# Patient Record
Sex: MALE | Race: WHITE
[De-identification: names, ages, dates, MRNs, and addresses within clinical notes are randomized per-mention and may not be internally consistent; named-entity substitution may affect disease eponyms.]

---

## 2020-09-16 ENCOUNTER — HOSPITAL ENCOUNTER (OUTPATIENT)
Dept: HOSPITAL 47 - RADXRYALE | Age: 75
Discharge: HOME | End: 2020-09-16
Attending: PHYSICIAN ASSISTANT
Payer: MEDICARE

## 2020-09-16 DIAGNOSIS — M99.71: Primary | ICD-10-CM

## 2020-09-16 DIAGNOSIS — M43.12: ICD-10-CM

## 2020-09-16 PROCEDURE — 72050 X-RAY EXAM NECK SPINE 4/5VWS: CPT

## 2020-09-16 NOTE — XR
EXAMINATION TYPE: XR cervical spine comp

 

DATE OF EXAM: 9/16/2020

 

TECHNIQUE: Frontal, lateral, oblique, and open mouth view of the cervical spine are obtained.

 

HISTORY:  M542 CERVICALGIA chronic neck pain for 8 months.

 

COMPARISON: None

 

FINDINGS:  Osseous structures are demineralized which is dorsal radiographic sensitivity. The cervica
l spine is visualized in its entirety from C1 thru the top of T1 level, there is grade 1 anterolisthe
sis C3 on C4 and to a greater degree C4-C5. There is grade 1 retrolisthesis C5 on C6. The pre-vertebr
al soft tissue appears within normal limits.  The C1-C2 articulation is within normal limits on the o
pen mouth view. Vertebral body heights are maintained. Moderate to severe disc space narrowing C5-C6 
and C6-C7 levels. The oblique images are within normal limits. Overlying soft tissue is unremarkable.


 

IMPRESSION: As above.

## 2021-10-05 ENCOUNTER — HOSPITAL ENCOUNTER (OUTPATIENT)
Dept: HOSPITAL 47 - RADECHMAIN | Age: 76
Discharge: HOME | End: 2021-10-05
Attending: FAMILY MEDICINE
Payer: MEDICARE

## 2021-10-05 DIAGNOSIS — I35.0: Primary | ICD-10-CM

## 2021-10-05 PROCEDURE — 93306 TTE W/DOPPLER COMPLETE: CPT

## 2021-10-07 NOTE — ECHOF
Referral Reason:R01.1 heart murmur, I10 hypertension



MEASUREMENTS

--------

HEIGHT: 167.6 cm

WEIGHT: 52.6 kg

BP: 

RVIDd:   3.8 cm     (< 3.3)

IVSd:   1.3 cm     (0.6 - 1.1)

LVIDd:   3.5 cm     (3.9 - 5.3)

LVPWd:   1.2 cm     (0.6 - 1.1)

IVSs:   1.6 cm

LVIDs:   1.8 cm

LVPWs:   1.5 cm

LAESV Index (A-L):   25.56 ml/m

Ao Diam:   3.5 cm     (2.0 - 3.7)

AV Cusp:   1.2 cm     (1.5 - 2.6)

LA Diam:   3.1 cm     (2.7 - 3.8)

MV EXCURSION:   9.024 mm     (> 18.000)

MV EF SLOPE:   79 mm/s     (70 - 150)

EPSS:   0.8 cm

MV E Kosta:   0.90 m/s

MV DecT:   263 ms

MV A Kosta:   1.37 m/s

MV E/A Ratio:   0.66 

AV maxP.14 mmHg

AV meanP.94 mmHg

RAP:   5.00 mmHg

RVSP:   38.75 mmHg







FINDINGS

--------

Sinus rhythm.

This was a technically adequate study.

The left ventricular size is normal.   There is mild concentric left ventricular hypertrophy.   Overa
ll left ventricular systolic function is normal with, an EF between 55 - 60 %.   The diastolic fillin
g pattern is normal for the age of the patient 16.85.

The right ventricle is mild to moderately enlarged.

Normal LA  size by volume 22+/-6 ml/m2.

The right atrial size is normal.

Interatrial and interventricular septum intact.

There is no evidence of aortic regurgitation.   Moderate to severe aortic stenosis with peak/mean pre
ssure gradient of 61.14mmHg / 35.94mmHg, the aortic valve area by continuity equation is 0.9cm.   Pe
ak/mean gradient across the Aortic Valve is 61.14mmHg / 35.94mmHg.

Mild mitral regurgitation is present.

Mild tricuspid regurgitation present.   There is borderline pulmonary hypertension.   The right ventr
icular systolic pressure, as measured by Doppler, is 38.75mmHg.

There is no pulmonic regurgitation present.

The aortic root size is normal.

Normal inferior vena cava with normal inspiratory collapse consistent with estimated right atrial pre
ssure of  5 mmHg.

There is no pericardial effusion.



CONCLUSIONS

--------

1. The left ventricular size is normal.

2. There is mild concentric left ventricular hypertrophy.

3. Overall left ventricular systolic function is normal with, an EF between 55 - 60 %.

4. The right ventricle is mild to moderately enlarged.

5. Moderate to severe aortic stenosis with peak/mean pressure gradient of 61.14mmHg / 35.94mmHg, the 
aortic valve area by continuity equation is 0.9cm.

6. Peak/mean gradient across the Aortic Valve is 61.14mmHg / 35.94mmHg.

7. Mild mitral regurgitation is present.

8. Mild tricuspid regurgitation present.

9. There is borderline pulmonary hypertension.

10. The right ventricular systolic pressure, as measured by Doppler, is 38.75mmHg.





SONOGRAPHER: Bessy Hameed RDCS

## 2023-06-13 ENCOUNTER — HOSPITAL ENCOUNTER (OUTPATIENT)
Dept: HOSPITAL 47 - CATHCVL | Age: 78
Discharge: HOME | End: 2023-06-13
Attending: INTERNAL MEDICINE
Payer: MEDICARE

## 2023-06-13 VITALS
RESPIRATION RATE: 17 BRPM | TEMPERATURE: 98.1 F | DIASTOLIC BLOOD PRESSURE: 70 MMHG | SYSTOLIC BLOOD PRESSURE: 154 MMHG | HEART RATE: 95 BPM

## 2023-06-13 VITALS — BODY MASS INDEX: 25.8 KG/M2

## 2023-06-13 DIAGNOSIS — I35.0: ICD-10-CM

## 2023-06-13 DIAGNOSIS — I11.9: ICD-10-CM

## 2023-06-13 DIAGNOSIS — E78.5: ICD-10-CM

## 2023-06-13 DIAGNOSIS — E11.51: ICD-10-CM

## 2023-06-13 DIAGNOSIS — I70.213: ICD-10-CM

## 2023-06-13 DIAGNOSIS — I25.110: Primary | ICD-10-CM

## 2023-06-13 DIAGNOSIS — Z79.899: ICD-10-CM

## 2023-06-13 DIAGNOSIS — I70.0: ICD-10-CM

## 2023-06-13 PROCEDURE — 36200 PLACE CATHETER IN AORTA: CPT

## 2023-06-13 PROCEDURE — 76937 US GUIDE VASCULAR ACCESS: CPT

## 2023-06-13 PROCEDURE — 93458 L HRT ARTERY/VENTRICLE ANGIO: CPT

## 2023-06-13 PROCEDURE — 75625 CONTRAST EXAM ABDOMINL AORTA: CPT

## 2023-06-13 PROCEDURE — 99152 MOD SED SAME PHYS/QHP 5/>YRS: CPT

## 2023-06-13 PROCEDURE — 75716 ARTERY X-RAYS ARMS/LEGS: CPT

## 2023-06-13 NOTE — P.PCN
Date of Procedure: 06/13/23


Operative Findings: 





                            


CARDIAC CATHETERIZATION





PERFORMING PHYSICIAN: 


Mohan Snyder MD, RPVI





PROCEDURE PERFORMED:


1.  Selective right and left coronary angiogram


2.  Left heart catheterization


3.  Ultrasound-guided access of the right femoral artery





INDICATION:


Unstable angina and valvular heart disease





COMPLICATION:


None





APPROACH:


Right common femoral artery





LEVEL OF SEDATION:


Moderate with sedation in length of 10 minutes





PROCEDURE DESCRIPTION:


After obtaining an informed consent, the patient was brought to cardiac cath 

lab.  





Local anesthesia was performed using lidocaine subcutaneously.  





The right common femoral artery was cannulated using Seldinger technique, the 

guidewire passed easily, following that we advanced a 6 Solomon Islander sheath dilator 

assembly, the wire and dilator were removed and sheath was flushed.  





Selective right and left coronary angiogram using a 6-Solomon Islander JR4 and JL  

catheters.  





Following that we did left heart catheterization using 6-Solomon Islander pigtail 

catheter.  





The procedure was completed there was no complication.





SELECTIVE CORONARY ANGIOGRAM:


The right coronary artery:


Large caliber vessel and dominant vessel.  The mid RCA has a lesion in the 

midportion appears to be in the range of 30-40%





Left main:


Has mild disease only.  Bifurcates into a 6 and LAD





The left circumflex:


Large caliber vessel and on dominant vessel.  The LCx appeared to have mild 

disease only.  Gives rises into an I am which bifurcates into 2 subbranches and 

appeared to have mild disease only.





The left anterior descending artery:


The ostial LAD has mild disease only.  The mid and distal LAD appeared to be 

angiographically normal.  The LAD gives rise into a diagonal branch which seems 

to have mild disease only.





HEMODYNAMICS:


The LVEDP was 12 mmHg.  There was a mean gradient across the valve of 51 mmHg





CONCLUSION:


1.  Mild nonobstructive coronary artery disease


2.  Severe aortic stenosis with a mean gradient across the valve of 51 mmHg

## 2023-06-13 NOTE — P.PCN
Date of Procedure: 06/13/23


Operative Findings: 











AN ABDOMINAL AORTOGRAM AND BILATERAL LOWER EXTREMITIES RUNOFF








PERFORMING PHYSICIAN:


Mohan Snyder MD





PROCEDURE PERFORMED:


1. An abdominal aortogram


2. Bilateral lower extremities runoff





INDICATION:


Bilateral lower extremity is intermittent claudication in this 78-year-old 

gentleman who underwent a duplex study revealed aortoiliac disease and fem-pop 

disease





COMPLICATION:


None





LEVEL OF SEDATION:


Moderate was sedation length of 11 minutes





APPROACH:


Right common femoral artery





PROCEDURE DESCRIPTION:


After obtaining informed consent and explaining the procedure benefits, risks, 

and complications, the patient was brought to the cardiac cath lab.  The right 

groin was prepped and draped in sterile fashion.  The right common femoral 

artery was cannulated using micropuncture technique, under ultrasound guidance. 

A micropuncture wire was advanced, and the micropuncture sheath was advanced 

over the wire, then the micropuncture sheath was exchanged over an 0.35 wire 

into a 5-Romanian sheath dilator assembly then the wire and dilator were removed 

and sheath was flushed.





We did an abdominal aortogram and bilateral lower extremities runoff using 5-

Romanian pigtail catheter using a power injection.  The catheter was initially 

placed at the level of the renal arteries, and it was pulled into above the 

bifurcation of the aorta into right and left common iliac arteries.





The procedure was completed and there was no complications.





SELECTIVE PERIPHERAL ANGIOGRAM:


The abdominal aorta:


Calcified was mild to moderate disease





The common iliac arteries:


Calcified was mild to moderate disease only





The external iliac arteries:


Calcified was mild disease only





The internal iliac arteries:


Are patent





The common femoral arteries:


Have mild disease only bilaterally





Superficial femoral arteries:


Both are occluded in the midportion





Popliteal arteries:


Appears to have mild disease only





Below the knees:


3 vessels runoff below the knee bilaterally





CONCLUSION:


Mild aortoiliac disease


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





POSTPROCEDURE MANAGEMENT: 


Medical treatment and follow-up with the patient and consider PTA

## 2023-06-13 NOTE — IR
EXAMINATION TYPE: IR angio abdominal w runoff

 

DATE OF EXAM: 6/13/2023

 

COMPARISON: NONE

 

HISTORY: Fluoroscopy time.

 

Fluoroscopy was provided to the referring clinician.

## 2023-07-13 ENCOUNTER — HOSPITAL ENCOUNTER (OUTPATIENT)
Dept: HOSPITAL 47 - LABWHC1 | Age: 78
Discharge: HOME | End: 2023-07-13
Attending: THORACIC SURGERY (CARDIOTHORACIC VASCULAR SURGERY)
Payer: MEDICARE

## 2023-07-13 DIAGNOSIS — Z01.818: Primary | ICD-10-CM

## 2023-07-13 DIAGNOSIS — I35.0: ICD-10-CM

## 2023-07-13 DIAGNOSIS — N28.9: ICD-10-CM

## 2023-07-13 DIAGNOSIS — Z79.899: ICD-10-CM

## 2023-07-13 DIAGNOSIS — Z79.01: ICD-10-CM

## 2023-07-13 DIAGNOSIS — E78.5: ICD-10-CM

## 2023-07-13 DIAGNOSIS — E87.8: ICD-10-CM

## 2023-07-13 DIAGNOSIS — E07.9: ICD-10-CM

## 2023-07-13 DIAGNOSIS — R35.0: ICD-10-CM

## 2023-07-13 DIAGNOSIS — E11.9: ICD-10-CM

## 2023-07-13 DIAGNOSIS — I35.1: ICD-10-CM

## 2023-07-13 DIAGNOSIS — R58: ICD-10-CM

## 2023-07-13 DIAGNOSIS — Z51.81: ICD-10-CM

## 2023-07-13 LAB
ALBUMIN SERPL-MCNC: 4.4 G/DL (ref 3.5–5)
ALBUMIN/GLOB SERPL: 1.4 {RATIO}
ALP SERPL-CCNC: 53 U/L (ref 38–126)
ALT SERPL-CCNC: 20 U/L (ref 4–49)
ANION GAP SERPL CALC-SCNC: 9 MMOL/L
APTT BLD: 24.4 SEC (ref 22–30)
AST SERPL-CCNC: 32 U/L (ref 17–59)
BASOPHILS # BLD AUTO: 0 K/UL (ref 0–0.2)
BASOPHILS NFR BLD AUTO: 1 %
BUN SERPL-SCNC: 28 MG/DL (ref 9–20)
CALCIUM SPEC-MCNC: 9.6 MG/DL (ref 8.4–10.2)
CHLORIDE SERPL-SCNC: 106 MMOL/L (ref 98–107)
CHOLEST SERPL-MCNC: 176 MG/DL (ref 0–200)
CO2 SERPL-SCNC: 23 MMOL/L (ref 22–30)
EOSINOPHIL # BLD AUTO: 0.3 K/UL (ref 0–0.7)
EOSINOPHIL NFR BLD AUTO: 5 %
ERYTHROCYTE [DISTWIDTH] IN BLOOD BY AUTOMATED COUNT: 4.21 M/UL (ref 4.3–5.9)
ERYTHROCYTE [DISTWIDTH] IN BLOOD: 12.3 % (ref 11.5–15.5)
GLOBULIN SER CALC-MCNC: 3.1 G/DL
GLUCOSE SERPL-MCNC: 108 MG/DL (ref 74–99)
HCT VFR BLD AUTO: 40.1 % (ref 39–53)
HDLC SERPL-MCNC: 48.9 MG/DL (ref 40–60)
HGB BLD-MCNC: 13.5 GM/DL (ref 13–17.5)
INR PPP: 1 (ref ?–1.2)
LDLC SERPL CALC-MCNC: 105.7 MG/DL (ref 0–131)
LYMPHOCYTES # SPEC AUTO: 0.8 K/UL (ref 1–4.8)
LYMPHOCYTES NFR SPEC AUTO: 13 %
MAGNESIUM SPEC-SCNC: 2 MG/DL (ref 1.6–2.3)
MCH RBC QN AUTO: 32 PG (ref 25–35)
MCHC RBC AUTO-ENTMCNC: 33.6 G/DL (ref 31–37)
MCV RBC AUTO: 95.4 FL (ref 80–100)
MONOCYTES # BLD AUTO: 0.5 K/UL (ref 0–1)
MONOCYTES NFR BLD AUTO: 8 %
NEUTROPHILS # BLD AUTO: 4.6 K/UL (ref 1.3–7.7)
NEUTROPHILS NFR BLD AUTO: 72 %
NT-PROBNP SERPL-MCNC: 149 PG/ML
PH UR: 7 [PH] (ref 5–8)
PLATELET # BLD AUTO: 245 K/UL (ref 150–450)
POTASSIUM SERPL-SCNC: 5.1 MMOL/L (ref 3.5–5.1)
PROT SERPL-MCNC: 7.5 G/DL (ref 6.3–8.2)
PT BLD: 10.5 SEC (ref 9–12)
SODIUM SERPL-SCNC: 138 MMOL/L (ref 137–145)
SP GR UR: 1.01 (ref 1–1.03)
T4 FREE SERPL-MCNC: 0.97 NG/DL (ref 0.78–2.19)
TRIGL SERPL-MCNC: 107 MG/DL (ref 0–149)
UROBILINOGEN UR QL STRIP: <2 MG/DL (ref ?–2)
VLDLC SERPL CALC-MCNC: 21.4 MG/DL (ref 5–40)
WBC # BLD AUTO: 6.4 K/UL (ref 3.8–10.6)

## 2023-07-13 PROCEDURE — 80061 LIPID PANEL: CPT

## 2023-07-13 PROCEDURE — 81003 URINALYSIS AUTO W/O SCOPE: CPT

## 2023-07-13 PROCEDURE — 83880 ASSAY OF NATRIURETIC PEPTIDE: CPT

## 2023-07-13 PROCEDURE — 36415 COLL VENOUS BLD VENIPUNCTURE: CPT

## 2023-07-13 PROCEDURE — 83036 HEMOGLOBIN GLYCOSYLATED A1C: CPT

## 2023-07-13 PROCEDURE — 85025 COMPLETE CBC W/AUTO DIFF WBC: CPT

## 2023-07-13 PROCEDURE — 85730 THROMBOPLASTIN TIME PARTIAL: CPT

## 2023-07-13 PROCEDURE — 94150 VITAL CAPACITY TEST: CPT

## 2023-07-13 PROCEDURE — 80074 ACUTE HEPATITIS PANEL: CPT

## 2023-07-13 PROCEDURE — 84443 ASSAY THYROID STIM HORMONE: CPT

## 2023-07-13 PROCEDURE — 80053 COMPREHEN METABOLIC PANEL: CPT

## 2023-07-13 PROCEDURE — 74174 CTA ABD&PLVS W/CONTRAST: CPT

## 2023-07-13 PROCEDURE — 83735 ASSAY OF MAGNESIUM: CPT

## 2023-07-13 PROCEDURE — 71275 CT ANGIOGRAPHY CHEST: CPT

## 2023-07-13 PROCEDURE — 85610 PROTHROMBIN TIME: CPT

## 2023-07-13 PROCEDURE — 87086 URINE CULTURE/COLONY COUNT: CPT

## 2023-07-13 PROCEDURE — 84439 ASSAY OF FREE THYROXINE: CPT

## 2023-07-13 NOTE — CT
EXAMINATION TYPE: CT TAVR Planning

 

DATE OF EXAM: 7/13/2023

 

HISTORY: TAVR.

 

CT DLP: 1614.4 mGycm  Automated Exposure Control for Dose Reduction was Utilized.

 

CONTRAST: 

CT scan of the chest, abdomen and pelvis is performed with IV Contrast, patient injected with 125ml m
L of Isovue 370.

 

COMPARISON: None

 

TECHNIQUE:  Helical imaging obtained through the chest, abdomen and pelvis during arterial phase irving
leandro administration of radiographic contrast intravenously.

 

FINDINGS:

See report from [employer] regarding preprocedural planning

 

CHEST:

 

Lower Neck and Thyroid: No significant findings

 

Lungs: Calcified granulomas are noted. Within the right upper lobe there is a noncalcified 6.5 mm pul
monary nodule image 38 sequence 10. Three-month follow-up is recommended.

 

Central Airway: No significant findings

 

Pleura: No significant findings

 

Pulmonary Arteries: No significant findings

 

Heart and Pericardium: No significant findings

 

Lymph Nodes: No significant findings

 

Mediastinum & Esophagus: No significant findings

 

ABDOMEN/PELVIS:

 

Please note arterial phase of the imaging limits detailed evaluation of the solid abdominal organs.

 

Liver: No significant findings

 

Spleen: No significant findings

 

Kidneys: Renal cystic changes noted bilaterally.

 

Adrenal Glands: No significant findings

 

Pancreas: No significant findings

 

Gallbladder: No significant findings

 

Bowel and Mesentery: No significant findings

 

Lymph Nodes: No significant findings

 

Urinary Bladder: No significant findings

 

Pelvic Organs: No significant findings

 

Other: Severe degenerative changes throughout the thoracic and lumbar spine.

 

Other Lines/Tubes/Devices/Hardware: None

 

IMPRESSION: 

1. Noncalcified right upper lobe pulmonary nodule. Three-month follow-up is recommended.

2. Evidence of remote granulomatous disease.

## 2023-07-19 ENCOUNTER — HOSPITAL ENCOUNTER (OUTPATIENT)
Dept: HOSPITAL 47 - CATHCVL | Age: 78
LOS: 1 days | Discharge: HOME | End: 2023-07-20
Attending: INTERNAL MEDICINE
Payer: MEDICARE

## 2023-07-19 DIAGNOSIS — E11.9: ICD-10-CM

## 2023-07-19 DIAGNOSIS — I73.9: Primary | ICD-10-CM

## 2023-07-19 DIAGNOSIS — Z79.899: ICD-10-CM

## 2023-07-19 DIAGNOSIS — E78.5: ICD-10-CM

## 2023-07-19 DIAGNOSIS — I10: ICD-10-CM

## 2023-07-19 LAB — GLUCOSE BLD-MCNC: 101 MG/DL (ref 70–110)

## 2023-07-19 PROCEDURE — 37253 INTRVASC US NONCORONARY ADDL: CPT

## 2023-07-19 PROCEDURE — 82565 ASSAY OF CREATININE: CPT

## 2023-07-19 PROCEDURE — 37252 INTRVASC US NONCORONARY 1ST: CPT

## 2023-07-19 RX ADMIN — HEPARIN SODIUM ONE UNIT: 1000 INJECTION, SOLUTION INTRAVENOUS; SUBCUTANEOUS at 08:40

## 2023-07-19 RX ADMIN — MIDAZOLAM ONE MG: 1 INJECTION INTRAMUSCULAR; INTRAVENOUS at 08:26

## 2023-07-19 RX ADMIN — MIDAZOLAM ONE MG: 1 INJECTION INTRAMUSCULAR; INTRAVENOUS at 07:55

## 2023-07-19 RX ADMIN — HEPARIN SODIUM ONE UNIT: 1000 INJECTION, SOLUTION INTRAVENOUS; SUBCUTANEOUS at 09:06

## 2023-07-19 NOTE — P.PCN
Date of Procedure: 07/19/23


Operative Findings: 








PERCUTANEOUS PERIPHERAL INTERVENTION





Performing physician


Mohan Snyder M.D.





Procedure performed


1.  Successful angioplasty of the left SFA with good angiographic results and 

reduction of stenosis from 99% to 0%


2.  Adjunctive use of orbital atherectomy and shockwave balloon and 

intravascular ultrasound


3.  Intravascular ultrasound of the right SFA and right popliteal as well as 

bilateral iliac arteries


4.  Ultrasound-guided access of the left common femoral artery


5.  Right lower extremity angiogram





Indication


Bilateral lower extremity is intermittent claudication who underwent recently an

angiogram and that revealed critical right SFA is occluded left SFA.  He was 

brought today to undergo a PTA of the right SFA





Approach


Left common femoral artery





Complications


None





Level of sedation


Moderate with a sedation time of 78 minutes





Procedure description


After obtaining an informed consent the patient was brought to the cardiac cath 

lab.  The left common femoral artery was cannulated using micropuncture techniqu

e under ultrasound guidance and the micropuncture wire passed easily then I 

placed a 6-Tuvaluan 70 cm sheath in the left common femoral artery.  At that point

anticoagulation was initiated using heparin would continue his ACT monitoring.  

The right SFA was selected using 035 stiff Glidewire with a backup support of 5-

Tuvaluan rim catheter then the long sheath was advanced over the wire and the 

catheter to the proximal right common femoral artery.  The right lower extremity

angiogram was performed and showed 2 vessels run off below the with anterior TPL

and peroneal and critical disease involving the mid right SFA.  At that point I 

crossed the lesion in the right SFA using 014 wire.  I did after that 

intravascular ultrasound which revealed extremely calcified lesion.  I did 

exchange my 014 wire into a 14 atherectomy wire preparing to do orbital 

atherectomy which was performed under local and medial and high-speed.  

Subsequently I did balloon angioplasty using shockwave balloon which was 6 mm.  

Intravascular ultrasound revealed a diameter about 6 mm with extremely calcified

SFA.  After that I did balloon arterial vascular was performed in angiogram was 

performed and showed a good angiographic results and for that reason I decided 

to move forward with drug-coated balloon which was 6.0 x 200 mm balloon which 

was inflated under 5 zaki for 30 minutes.  Final angiogram was performed and sh

owed good angiographic results with good flow below the knee as well.  After 

that I did intravascular ultrasound of bilateral iliacs and that showed a 

diameter of the iliac 6 mm with the absence of any high-grade stenosis.  The 

procedure was completed was no complication.  After that I did exchange my long 

sheath into short sheath using 035 stiff Glidewire before I did selective left 

common femoral artery angiogram.  The procedure was completed was no 

complication





Postprocedure management


1. Dual antiplatelet therapy


2. Aggressive cholesterol control


3. Risk factors modification


4. Follow-up with the patient

## 2023-07-20 VITALS
RESPIRATION RATE: 18 BRPM | TEMPERATURE: 97.8 F | SYSTOLIC BLOOD PRESSURE: 108 MMHG | DIASTOLIC BLOOD PRESSURE: 64 MMHG | HEART RATE: 75 BPM

## 2023-07-20 NOTE — P.DS
Providers


Attending physician: 


Mohan Snyder





Primary care physician: 


[facility] Course: 





The patient is a pleasant 78-year-old gentleman who underwent yesterday 

successful balloon angioplasty of the right SFA with a good angiographic results

from left groin approach.





The patient was seen this morning.  He is asymptomatic.  The right foot has a 

great pulse in the dorsalis pedis artery.





The patient is going to be discharged home on dual antiplatelet therapy and 

statin and I will follow-up with the patient next week in the office





Plan - Discharge Summary


Discharge Rx Participant: No


New Discharge Prescriptions: 


New


   Atorvastatin Calcium [Lipitor] 40 mg PO DAILY #90 tablet


   Clopidogrel [Plavix] 75 mg PO DAILY #90 tablet





Continue


   Cyanocobalamin (Vitamin B-12) [Vitamin B-12] 1,000 mcg PO DAILY


   Cholecalciferol [Vitamin D3 (25 Mcg = 1000 Iu)] 25 mcg PO DAILY


   Ubidecarenone [Co Q-10] 100 mg PO DAILY


   Pravastatin Sodium 80 mg PO DAILY


   lisinopriL [Zestril] 5 mg PO BID


   Vitamin K2 90 mcg PO DAILY


   Multivitamin/Iron/Folic Acid [Centrum Adults Tablet] 1 each PO DAILY


   Fluticasone Nasal Spray [Flonase Nasal Spray] 1 spray EA NOSTRIL BID


   Aspirin 81 mg PO DAILY


Discharge Medication List





Cholecalciferol [Vitamin D3 (25 Mcg = 1000 Iu)] 25 mcg PO DAILY 05/19/22 

[History]


Cyanocobalamin (Vitamin B-12) [Vitamin B-12] 1,000 mcg PO DAILY 05/19/22 

[History]


Ubidecarenone [Co Q-10] 100 mg PO DAILY 05/19/22 [History]


Vitamin K2 90 mcg PO DAILY 05/19/22 [History]


lisinopriL [Zestril] 5 mg PO BID 05/19/22 [History]


Fluticasone Nasal Spray [Flonase Nasal Spray] 1 spray EA NOSTRIL BID 06/09/23 

[History]


Multivitamin/Iron/Folic Acid [Centrum Adults Tablet] 1 each PO DAILY 06/09/23 

[History]


Pravastatin Sodium 80 mg PO DAILY 06/09/23 [History]


Aspirin 81 mg PO DAILY 06/13/23 [History]


Atorvastatin Calcium [Lipitor] 40 mg PO DAILY #90 tablet 07/20/23 [Rx]


Clopidogrel [Plavix] 75 mg PO DAILY #90 tablet 07/20/23 [Rx]








Follow up Appointment(s)/Referral(s): 


Mohan Snyder MD [STAFF PHYSICIAN] - 08/02/23 2:15 pm


Patient Instructions/Handouts:  Angiography (DC)


Discharge Disposition: HOME SELF-CARE

## 2023-08-23 ENCOUNTER — HOSPITAL ENCOUNTER (INPATIENT)
Dept: HOSPITAL 47 - 2ORMAIN | Age: 78
LOS: 22 days | Discharge: HOME | DRG: 267 | End: 2023-09-14
Attending: INTERNAL MEDICINE | Admitting: INTERNAL MEDICINE
Payer: MEDICARE

## 2023-08-23 VITALS — BODY MASS INDEX: 26.1 KG/M2

## 2023-08-23 DIAGNOSIS — I35.2: Primary | ICD-10-CM

## 2023-08-23 DIAGNOSIS — K22.2: ICD-10-CM

## 2023-08-23 DIAGNOSIS — E78.5: ICD-10-CM

## 2023-08-23 DIAGNOSIS — E11.22: ICD-10-CM

## 2023-08-23 DIAGNOSIS — I73.9: ICD-10-CM

## 2023-08-23 DIAGNOSIS — K59.00: ICD-10-CM

## 2023-08-23 DIAGNOSIS — N18.30: ICD-10-CM

## 2023-08-23 DIAGNOSIS — I12.9: ICD-10-CM

## 2023-08-23 DIAGNOSIS — E11.51: ICD-10-CM

## 2023-08-23 DIAGNOSIS — I25.10: ICD-10-CM

## 2023-08-23 DIAGNOSIS — Z00.6: ICD-10-CM

## 2023-08-23 DIAGNOSIS — Z79.82: ICD-10-CM

## 2023-08-23 DIAGNOSIS — I35.8: ICD-10-CM

## 2023-08-23 DIAGNOSIS — Z71.3: ICD-10-CM

## 2023-08-23 DIAGNOSIS — Z79.02: ICD-10-CM

## 2023-08-23 DIAGNOSIS — Z87.891: ICD-10-CM

## 2023-08-23 PROCEDURE — 71045 X-RAY EXAM CHEST 1 VIEW: CPT

## 2023-08-23 PROCEDURE — 82947 ASSAY GLUCOSE BLOOD QUANT: CPT

## 2023-08-23 PROCEDURE — 37221: CPT

## 2023-08-23 PROCEDURE — 93306 TTE W/DOPPLER COMPLETE: CPT

## 2023-08-23 PROCEDURE — 85730 THROMBOPLASTIN TIME PARTIAL: CPT

## 2023-08-23 PROCEDURE — 82330 ASSAY OF CALCIUM: CPT

## 2023-08-23 PROCEDURE — 85025 COMPLETE CBC W/AUTO DIFF WBC: CPT

## 2023-08-23 PROCEDURE — 85347 COAGULATION TIME ACTIVATED: CPT

## 2023-08-23 PROCEDURE — 33361 REPLACE AORTIC VALVE PERQ: CPT

## 2023-08-23 PROCEDURE — 85610 PROTHROMBIN TIME: CPT

## 2023-08-23 PROCEDURE — 80053 COMPREHEN METABOLIC PANEL: CPT

## 2023-08-23 PROCEDURE — 37252 INTRVASC US NONCORONARY 1ST: CPT

## 2023-08-23 PROCEDURE — 83735 ASSAY OF MAGNESIUM: CPT

## 2023-09-05 ENCOUNTER — HOSPITAL ENCOUNTER (OUTPATIENT)
Dept: HOSPITAL 47 - CATHCVL | Age: 78
LOS: 1 days | Discharge: HOME | End: 2023-09-06
Attending: INTERNAL MEDICINE
Payer: MEDICARE

## 2023-09-05 DIAGNOSIS — I73.9: ICD-10-CM

## 2023-09-05 DIAGNOSIS — Z79.899: ICD-10-CM

## 2023-09-05 DIAGNOSIS — I65.23: Primary | ICD-10-CM

## 2023-09-05 DIAGNOSIS — Z79.82: ICD-10-CM

## 2023-09-05 DIAGNOSIS — Z79.02: ICD-10-CM

## 2023-09-05 LAB
ALBUMIN SERPL-MCNC: 4.1 G/DL (ref 3.5–5)
ALP SERPL-CCNC: 53 U/L (ref 38–126)
ALT SERPL-CCNC: 18 U/L (ref 4–49)
ANION GAP SERPL CALC-SCNC: 5 MMOL/L
AST SERPL-CCNC: 30 U/L (ref 17–59)
BASOPHILS # BLD AUTO: 0 K/UL (ref 0–0.2)
BASOPHILS NFR BLD AUTO: 1 %
BUN SERPL-SCNC: 27 MG/DL (ref 9–20)
CALCIUM SPEC-MCNC: 9.6 MG/DL (ref 8.4–10.2)
CHLORIDE SERPL-SCNC: 105 MMOL/L (ref 98–107)
CO2 SERPL-SCNC: 27 MMOL/L (ref 22–30)
EOSINOPHIL # BLD AUTO: 0.4 K/UL (ref 0–0.7)
EOSINOPHIL NFR BLD AUTO: 7 %
ERYTHROCYTE [DISTWIDTH] IN BLOOD BY AUTOMATED COUNT: 3.92 M/UL (ref 4.3–5.9)
ERYTHROCYTE [DISTWIDTH] IN BLOOD: 12.7 % (ref 11.5–15.5)
GLUCOSE BLD-MCNC: 108 MG/DL (ref 70–110)
GLUCOSE SERPL-MCNC: 104 MG/DL (ref 74–99)
HCT VFR BLD AUTO: 37.5 % (ref 39–53)
HGB BLD-MCNC: 12.4 GM/DL (ref 13–17.5)
LYMPHOCYTES # SPEC AUTO: 1 K/UL (ref 1–4.8)
LYMPHOCYTES NFR SPEC AUTO: 16 %
MCH RBC QN AUTO: 31.5 PG (ref 25–35)
MCHC RBC AUTO-ENTMCNC: 32.9 G/DL (ref 31–37)
MCV RBC AUTO: 95.7 FL (ref 80–100)
MONOCYTES # BLD AUTO: 0.6 K/UL (ref 0–1)
MONOCYTES NFR BLD AUTO: 10 %
NEUTROPHILS # BLD AUTO: 3.8 K/UL (ref 1.3–7.7)
NEUTROPHILS NFR BLD AUTO: 63 %
PLATELET # BLD AUTO: 234 K/UL (ref 150–450)
POTASSIUM SERPL-SCNC: 4.8 MMOL/L (ref 3.5–5.1)
PROT SERPL-MCNC: 7.1 G/DL (ref 6.3–8.2)
SODIUM SERPL-SCNC: 137 MMOL/L (ref 137–145)
WBC # BLD AUTO: 6 K/UL (ref 3.8–10.6)

## 2023-09-05 PROCEDURE — 86901 BLOOD TYPING SEROLOGIC RH(D): CPT

## 2023-09-05 PROCEDURE — 80053 COMPREHEN METABOLIC PANEL: CPT

## 2023-09-05 PROCEDURE — 86900 BLOOD TYPING SEROLOGIC ABO: CPT

## 2023-09-05 PROCEDURE — 37252 INTRVASC US NONCORONARY 1ST: CPT

## 2023-09-05 PROCEDURE — 86850 RBC ANTIBODY SCREEN: CPT

## 2023-09-05 PROCEDURE — 85025 COMPLETE CBC W/AUTO DIFF WBC: CPT

## 2023-09-05 PROCEDURE — 82565 ASSAY OF CREATININE: CPT

## 2023-09-05 RX ADMIN — FENTANYL CITRATE ONE MCG: 50 INJECTION, SOLUTION INTRAMUSCULAR; INTRAVENOUS at 09:49

## 2023-09-05 RX ADMIN — MIDAZOLAM ONE MG: 1 INJECTION INTRAMUSCULAR; INTRAVENOUS at 09:19

## 2023-09-05 RX ADMIN — HEPARIN SODIUM ONE UNIT: 1000 INJECTION INTRAVENOUS; SUBCUTANEOUS at 08:11

## 2023-09-05 RX ADMIN — FENTANYL CITRATE ONE MCG: 50 INJECTION, SOLUTION INTRAMUSCULAR; INTRAVENOUS at 09:19

## 2023-09-05 RX ADMIN — HEPARIN SODIUM ONE UNIT: 1000 INJECTION INTRAVENOUS; SUBCUTANEOUS at 08:25

## 2023-09-05 RX ADMIN — FLUTICASONE PROPIONATE SCH: 50 SPRAY, METERED NASAL at 21:03

## 2023-09-05 RX ADMIN — MIDAZOLAM ONE MG: 1 INJECTION INTRAMUSCULAR; INTRAVENOUS at 07:50

## 2023-09-05 RX ADMIN — FENTANYL CITRATE ONE MCG: 50 INJECTION, SOLUTION INTRAMUSCULAR; INTRAVENOUS at 08:35

## 2023-09-05 NOTE — P.PCN
Date of Procedure: 09/05/23


Operative Findings: 








PERCUTANEOUS PERIPHERAL INTERVENTION





Performing physician


Mohan Snyder M.D.





Procedure performed


1.  Successful angioplasty and stenting of the left SFA using Zilver PTX drug-

coated stents with an excellent angiographic results


2.  Adjunctive use of intravascular ultrasound and lithotripsy balloon and 

orbital atherectomy device


3.  Left lower extremity angiogram


4.  Right common femoral artery angiogram





Indication


Occluded left SFA in this 78-year-old gentleman who is experiencing symptoms of 

left lower extremity intermittent claudication consistent was Jonnie class 

IIa.





Approach


Right common femoral artery





Complications


None





Level of sedation


Moderate with a sedation time of 2 hours and 34 minutes 





Procedure description


After obtaining an informed consent the patient was brought to the cardiac cath 

lab.  The right common femoral artery was cannulated using micropuncture 

technique and the micropuncture wire passed easily then I placed a 6-Dominican 

sheath the right common femoral artery.  At that point anticoagulation was 

initiated using heparin with continuous ACT monitoring.  After that I did place 

a 70 cm 6-Dominican sheath over 035 wire.  Subsequently I did selective left the 

profunda using 035 stiff Glidewire with a backup support of rim catheter.  

Subsequently the sheath was advanced over the wire and the rim catheter over the

way to the left common femoral artery where left lower extremity angiogram was 

performed and showed 2 vessels run off with anterior tibial and peroneal and 

patent left popliteal and occluded left SFA which was extremely calcified.  

Crossing the chronic total occlusion of the left SFA was performed using 018 

wire with a backup support of 018 catheter.  I proven that I was in the true 

lumen with injections with 018 catheter.  After that we decided to create a tiny

lumen by performing balloon angioplasty and that was a 4 mm balloon angioplasty.

 After the balloon angioplasty was performed and attempting pulling the balloon 

out over 014 wire and that was the Viber wire the balloon ripped.  We attempted 

retrieving the balloon using a snare but that was unsuccessful after that I 

placed an 014 wire and I decided to the balloon inside the sheath and 

subsequently I advanced an 01 4 x 20 mm x 18 mm balloon.  The balloon was 

inflated inside the sheath just to hold the other part of the ribs balloon.  

Subsequently I pulled the sheath out with the balloon attached to it.  We left 

with 014 wire which was subsequently exchanged over microcatheter into an 035 

wire.  After that I did advance the long sheath again and I went up and over 

again.  I rewire the SFA using an 018 Bone to the Glidewire which was an 

exchange in 2014 wire where we did intravascular ultrasound and that showed the 

diameter around 6 mm was heavily calcified vessel.  Again balloon angioplasty 

was performed using 014 this time noncompliant balloon which was inflated 

multiple times in the SFA in the distal and mid and proximal portion.  After 

that did use 6 mm lithotripsy balloon which was inflated in the distal and mid 

and proximal left SFA again with delivering the shocks.  After that angiogram 

was performed and showed flow limiting dissection involving the distal and mid 

left SFA and also what it seems to be dissection involving the ostial left SFA. 

We place 3 stents.  Distally I placed a 7.0 x 140 mm stent and in the midportion

7.0 x 1 29 m stent and proximally 7.0 x 18 mm stents.  Postdilatation was 

performed using 6 m noncompliant balloon with final angiogram showing excellent 

angiographic results and the procedure was completed was no complication.  After

that I exchanged my long sheath into short sheath using 035 wire before I did 

selective right common femoral artery angiogram





Postprocedure management


1. Dual antiplatelet therapy


2. Aggressive cholesterol control


3. Risk factors modification


4. Follow-up with the patient

## 2023-09-05 NOTE — IR
EXAMINATION TYPE: IR stent intravas non coronary

 

DATE OF EXAM: 9/5/2023

 

COMPARISON: NONE

 

HISTORY: Fluoroscopy time.

 

Fluoroscopy was provided to the referring clinician.

## 2023-09-06 VITALS — SYSTOLIC BLOOD PRESSURE: 113 MMHG | TEMPERATURE: 98.8 F | HEART RATE: 96 BPM | DIASTOLIC BLOOD PRESSURE: 66 MMHG

## 2023-09-06 VITALS — RESPIRATION RATE: 16 BRPM

## 2023-09-06 RX ADMIN — FLUTICASONE PROPIONATE SCH SPRAY: 50 SPRAY, METERED NASAL at 09:21

## 2023-09-06 NOTE — P.DS
Providers


Attending physician: 


Mohan Snyder





Primary care physician: 


[facility] Course: 





The patient is an 78-year-old gentleman who underwent yesterday successful 

recanalizing chronically occluded left SFA with a good angiographic results and 

with no complication from right groin approach





He was seen this morning.  He is asymptomatic and hemodynamically stable.  The 

right groin is soft and nontender was no bruises.  I was able to feel pedal 

pulse in the left anterior tibial artery.





The patient is going to be discharged home on dual antiplatelet therapy and I'll

follow-up with the patient next week in the office





Plan - Discharge Summary


Discharge Rx Participant: Yes


New Discharge Prescriptions: 


Continue


   Cyanocobalamin (Vitamin B-12) [Vitamin B-12] 1,000 mcg PO DAILY


   Cholecalciferol [Vitamin D3 (25 Mcg = 1000 Iu)] 25 mcg PO DAILY


   Ubidecarenone [Co Q-10] 100 mg PO DAILY


   Pravastatin Sodium 80 mg PO DAILY


   Clopidogrel [Plavix] 75 mg PO 1200


   lisinopriL [Zestril] 5 mg PO BID


   Vitamin K2 90 mcg PO DAILY


   Multivitamin/Iron/Folic Acid [Centrum Adults Tablet] 1 each PO DAILY


   Fluticasone Nasal Spray [Flonase Nasal Spray] 1 spray EA NOSTRIL BID


   Aspirin 81 mg PO DAILY


Discharge Medication List





Cholecalciferol [Vitamin D3 (25 Mcg = 1000 Iu)] 25 mcg PO DAILY 05/19/22 

[History]


Cyanocobalamin (Vitamin B-12) [Vitamin B-12] 1,000 mcg PO DAILY 05/19/22 [His

tory]


Ubidecarenone [Co Q-10] 100 mg PO DAILY 05/19/22 [History]


Vitamin K2 90 mcg PO DAILY 05/19/22 [History]


lisinopriL [Zestril] 5 mg PO BID 05/19/22 [History]


Fluticasone Nasal Spray [Flonase Nasal Spray] 1 spray EA NOSTRIL BID 06/09/23 

[History]


Multivitamin/Iron/Folic Acid [Centrum Adults Tablet] 1 each PO DAILY 06/09/23 

[History]


Pravastatin Sodium 80 mg PO DAILY 06/09/23 [History]


Aspirin 81 mg PO DAILY 06/13/23 [History]


Clopidogrel [Plavix] 75 mg PO 1200 08/30/23 [History]








Follow up Appointment(s)/Referral(s): 


Mohan Snyder MD [STAFF PHYSICIAN] - 09/18/23 10:15 am


Patient Instructions/Handouts:  Moderate Sedation (DC), Peripheral Vascular 

Angioplasty (DC), Peripheral Vascular Stent Placement (DC)

## 2023-09-13 LAB
ALBUMIN SERPL-MCNC: 3.2 G/DL (ref 3.5–5)
ALP SERPL-CCNC: 47 U/L (ref 38–126)
ALT SERPL-CCNC: 25 U/L (ref 4–49)
ANION GAP SERPL CALC-SCNC: 4 MMOL/L
APTT BLD: >200 SEC (ref 22–30)
AST SERPL-CCNC: 40 U/L (ref 17–59)
BASOPHILS # BLD AUTO: 0 K/UL (ref 0–0.2)
BASOPHILS NFR BLD AUTO: 0 %
BUN SERPL-SCNC: 21 MG/DL (ref 9–20)
CALCIUM SPEC-MCNC: 8.8 MG/DL (ref 8.4–10.2)
CHLORIDE SERPL-SCNC: 109 MMOL/L (ref 98–107)
CO2 SERPL-SCNC: 22 MMOL/L (ref 22–30)
EOSINOPHIL # BLD AUTO: 0.3 K/UL (ref 0–0.7)
EOSINOPHIL NFR BLD AUTO: 5 %
ERYTHROCYTE [DISTWIDTH] IN BLOOD BY AUTOMATED COUNT: 3.29 M/UL (ref 4.3–5.9)
ERYTHROCYTE [DISTWIDTH] IN BLOOD: 12.2 % (ref 11.5–15.5)
GLUCOSE BLD-MCNC: 109 MG/DL (ref 70–110)
GLUCOSE SERPL-MCNC: 114 MG/DL (ref 74–99)
HCT VFR BLD AUTO: 31.7 % (ref 39–53)
HGB BLD-MCNC: 10.4 GM/DL (ref 13–17.5)
INR PPP: 1.1 (ref ?–1.2)
LYMPHOCYTES # SPEC AUTO: 0.6 K/UL (ref 1–4.8)
LYMPHOCYTES NFR SPEC AUTO: 11 %
MAGNESIUM SPEC-SCNC: 2.1 MG/DL (ref 1.6–2.3)
MCH RBC QN AUTO: 31.6 PG (ref 25–35)
MCHC RBC AUTO-ENTMCNC: 32.8 G/DL (ref 31–37)
MCV RBC AUTO: 96.4 FL (ref 80–100)
MONOCYTES # BLD AUTO: 0.3 K/UL (ref 0–1)
MONOCYTES NFR BLD AUTO: 5 %
NEUTROPHILS # BLD AUTO: 4.5 K/UL (ref 1.3–7.7)
NEUTROPHILS NFR BLD AUTO: 78 %
PLATELET # BLD AUTO: 252 K/UL (ref 150–450)
POTASSIUM SERPL-SCNC: 5.3 MMOL/L (ref 3.5–5.1)
PROT SERPL-MCNC: 6.1 G/DL (ref 6.3–8.2)
PT BLD: 11.7 SEC (ref 9–12)
SODIUM SERPL-SCNC: 135 MMOL/L (ref 137–145)
WBC # BLD AUTO: 5.7 K/UL (ref 3.8–10.6)

## 2023-09-13 PROCEDURE — B44LZZ3 ULTRASONOGRAPHY OF FEMORAL ARTERY, INTRAVASCULAR: ICD-10-PCS

## 2023-09-13 PROCEDURE — 5A1223Z PERFORMANCE OF CARDIAC PACING, CONTINUOUS: ICD-10-PCS

## 2023-09-13 PROCEDURE — B24BZZ4 ULTRASONOGRAPHY OF HEART WITH AORTA, TRANSESOPHAGEAL: ICD-10-PCS

## 2023-09-13 PROCEDURE — B3101ZZ FLUOROSCOPY OF THORACIC AORTA USING LOW OSMOLAR CONTRAST: ICD-10-PCS

## 2023-09-13 PROCEDURE — 02RF38Z REPLACEMENT OF AORTIC VALVE WITH ZOOPLASTIC TISSUE, PERCUTANEOUS APPROACH: ICD-10-PCS

## 2023-09-13 PROCEDURE — 04QL0ZZ REPAIR LEFT FEMORAL ARTERY, OPEN APPROACH: ICD-10-PCS

## 2023-09-13 RX ADMIN — SODIUM CHLORIDE, PRESERVATIVE FREE SCH ML: 5 INJECTION INTRAVENOUS at 20:17

## 2023-09-13 RX ADMIN — FLUTICASONE PROPIONATE SCH SPRAY: 50 SPRAY, METERED NASAL at 20:17

## 2023-09-13 RX ADMIN — HEPARIN SODIUM SCH UNIT: 5000 INJECTION, SOLUTION INTRAVENOUS; SUBCUTANEOUS at 23:49

## 2023-09-13 NOTE — XR
EXAMINATION TYPE: XR chest 1V portable

 

DATE OF EXAM: 9/13/2023 2:17 PM

 

CLINICAL INDICATION:Male, 78 years old with history of Post Operative Cardiac Surgery; [facility]

 

COMPARISON: None

 

TECHNIQUE: XR chest 1V portable Frontal view of the chest.

 

FINDINGS: 

Lungs/Pleura: Low lung volumes are present. There is no evidence of pleural effusion, focal consolida
tion, or pneumothorax.  

Pulmonary vascularity: Unremarkable.

Heart/mediastinum: Cardiomediastinal silhouette is unremarkable.  Post aortic valve repair changes.

Musculoskeletal: No acute osseous pathology.

 

Other findings: None

 

IMPRESSION: 

Aortic valve repair change. Low lung volumes. No significant pulmonary vascular congestion

## 2023-09-13 NOTE — P.OP
Description of Procedure: 


Transcatheter Aoritc Valve Replacement Operative report








PROCEDURE PERFORMED:


1. Percutaneous Aortic Valve Implantation using a 29 mm Evolut-FX.


2. Transthoracic echocardiography 


3. Ultrasound guided access and repair of left femoral artery access site by 

Perclose closure device.


4. Placement of temporary pacemaker wire.


5. Aortic root angiography


6. Pre TAVR balloon aortic valvuloplasty with a 22mm True balloon 


7. Repair of left femoral artery using a 6.0 x 120mm Supera stent, post dilated 

with a 6.0 balloon


8. IVUS left femoral artery





INDICATIONS:


1. 78 year-old with a history of severe symptomatic aortic valve stenosis.





PERFORMING PHYSICIANS:


1. Samir Strong,  Interventional Cardiology


2. Pamela Hernandez MD, Cardiothoracic Surgeon.








SEDATION: Moderate conscious sedation was performed by anesthesia, see separate 

note


APPROACH: Left femoral artery via percutaneous approach





PROCEDURE DESCRIPTION:


The patient was discussed at valve clinic with multidisciplinary approach with 

cardiothoracic surgeon as well as cardiologist and thought better treated with 

TAVR. Risks, benefits, and alternatives of the procedure had been explained to 

the patient who understood the risks and agreed to proceed.  Patient was noted 

to have significant left femoral and iliac artery disease however preprocedure 

discussion as well as during TAVR consult, we discussed planned advancement of 

the sheath with anticipated need for percutaneous intervention.  After consents 

were obtained, patient was brought to the transcatheter aortic valve 

implantation room in the cardiac cath lab and MAC anesthesia was provided by the

anesthesiologist (see separate report) secondary to pumonary status and 

esophageal stricture. Once full body sterile prep was performed, right femoral 

venous access was obtained and a 6Fr sheath was placed.  A temporary pacemaker 

was placed in the RV.  Pacing threshholds were checked and deemed appropriate.  

Next the right femoral artery was accessed using a modified Seldinger technique,

ultrasound guidance and micropuncture technique.  A 6 Congolese Rabi sheath was 

placed in the right femoral artery.  Next, a 6-Congolese pigtail catheter was 

advanced into the aorta and positioned in the aortic root, aortic root 

angiography was performed to determine optimal deployment angle. 





The left femoral artery was accessed using modified Seldinger technique, 

micropuncture technique and under direct ultrasound guidance. Femoral angiogram 

was done showing access in the common femoral artery and a 6Fr sheath was p

laced.  Next preclose technique was performed using 2 Perclose.  Next a 0.035 

Lunderquist wire was placed in the Aorta via a pigtail catheter. Over that the 

arteriotomy was serially dilated and a 14 Fr Kimmswick sheath was placed.  Next a 6F-

AL1 catheter was advanced over a wire to the aortic root. A straight wire was 

advanced through the catheter and used to cross the severely stenotic valve. The

AL1 was then exchanged for a 6Fr pigtail catheter and pressure measurements were

obtained.  The 0.035 Lunderquist wire was then positioned in the apex.  Next 

balloon aortic valvuloplasty was performed with a 22mm True balloon.  Next a 29 

mm Evolut-FX was advanced. The valve was then positioned across the aortic valve

and confirmed with aortic root angiography.  The valve was then deployed in 

proper position using slow deployment and with rapid pacing in conjuncture with 

aortic root angiography. The delivery system was withdrawn back into the arch 

and an aortic root injection in conjunction with TTE demonstrated a satisfactory

result.  There was mild para valvular leak. There was no evidence of any other 

significant abnormalities. The preclose Perclose was then deployed in the left 

femoral artery and hemostasis was achieved. A 6Fr rim catheter was then advanced

to the level of the iliac bifurcation via the right femoral access. Femoral 

angiogram was performed that showed occlusion of the left external iliac artery 

into the left common femoral artery.  Therefore a 0.035 glide wire and a 0.014 

BMW wire were used to attempt to wire from above however unable to pass past the

femoral site.  Did not want to extend any possible dissection down and therefore

a microcatheter needle was used to access the left common femoral artery 

percutaneously and a 0.014 whisper wire was advanced.  A 0.018 Supercross 

catheter was advanced over the 0.014 whisper wire for more support.  Next the 

0.014 whisper was able to wire the iliac lesion from below and advanced into the

aorta.  The wire was snared with a 6.0 snare and externalized.  Balloon 

angioplasty was performed with a 4.0 then 5.0 balloon from the right femoral 

site with restoration of flow.  IVUS was performed which showed dissection of 

the left common iliac into the proximal to mid left common femoral artery.  

Therefore decision was made to perform stenting. The 6Fr destination sheath was 

exchanged for a 7Fr Rabi.  A 6.0 x 120mm Supera stent was placed in the left 

external iliac artery into the left common femoral artery.  The stent was post 

dilated with a 6.0 balloon.  The wire was pulled and final angiograms were 

performed.  There was uninhibited flow with no dissection.  





The right femoral angiogram demonstrated an arteriotomy in the common femoral 

artery however diffuse disease and therefore a short 7Fr sheath was placed to be

pulled at a later time. The temporary venous pacemaker was removed and the 

venous sheath left in place to be pulled at a later time.  The patient was then 

transported to the ICU in hemodynamically stable condition, requiring no pressor

support.





COMPLICATIONS: None





CONCLUSION:


1. Implantaion of 29 mm Evolut-FX transcatheter aortic valve via left femoral 

approach under PETER and fluoro guidance with mild paravalvular aortic 

regurgitation.


2. Placement of temporary pacemaker wire


3. Aortic Root Aortogram.


4. Pre TAVR balloon aortic valvuloplasty with a 22mm True balloon 


5. Repair of left femoral artery using a 6.0 x 120mm Supera stent, post dilated 

with a 6.0 balloon





RECOMMENDATIONS:


The patient will be monitored in the ICU for hemodynamic and electrical 

stability.

## 2023-09-13 NOTE — OP
OPERATIVE REPORT



DATE OF SERVICE : 09/13/2023



SURGEONS:

1. Dr. Pamela Hernandez.

2. Dr. Samir Strong.



PREOPERATIVE DIAGNOSES:

1. Severe symptomatic aortic valve stenosis.

2. Esophageal stricture.



POSTOPERATIVE DIAGNOSES:

1. Severe symptomatic aortic valve stenosis.

2. Esophageal stricture.



PROCEDURES PERFORMED:

1. Insertion of right ventricular temporary pacing wire.

2. Predilatation of the aortic valve using a 22 mm True balloon.

3. Transcatheter aortic valve replacement using a 29 mm Evolut FX CoreValve. from

    [employer].



DESCRIPTION OF PROCEDURE:

The technical details will be dictated by Dr. Strong.  The procedure was performed

with local anesthesia and IV sedation as the patient has an esophageal stricture

precluding performing a PETER with an adult scope.  I fully participated in the proper

dilatation initially of the aortic valve with a 22 mm True balloon followed by

transcatheter aortic valve replacement using a 29 mm Evolut FX CoreValve from

[employer].  2D echo showed low gradient, and no evidence of paravalvular leak.

Aortogram showed no significant aortic valve regurgitation, and hemodynamic measurement

showed no residual gradient across the valve.  No changes to the EKG.





MMODL / IJN: [id number] / Job#: [id number]

## 2023-09-14 VITALS — RESPIRATION RATE: 20 BRPM | SYSTOLIC BLOOD PRESSURE: 124 MMHG | DIASTOLIC BLOOD PRESSURE: 55 MMHG | HEART RATE: 93 BPM

## 2023-09-14 VITALS — TEMPERATURE: 98.3 F

## 2023-09-14 LAB
ALBUMIN SERPL-MCNC: 3.4 G/DL (ref 3.5–5)
ALP SERPL-CCNC: 55 U/L (ref 38–126)
ALT SERPL-CCNC: 34 U/L (ref 4–49)
ANION GAP SERPL CALC-SCNC: 9 MMOL/L
AST SERPL-CCNC: 57 U/L (ref 17–59)
BASOPHILS # BLD AUTO: 0 K/UL (ref 0–0.2)
BASOPHILS NFR BLD AUTO: 0 %
BUN SERPL-SCNC: 18 MG/DL (ref 9–20)
CALCIUM SPEC-MCNC: 9 MG/DL (ref 8.4–10.2)
CHLORIDE SERPL-SCNC: 105 MMOL/L (ref 98–107)
CO2 SERPL-SCNC: 22 MMOL/L (ref 22–30)
EOSINOPHIL # BLD AUTO: 0.3 K/UL (ref 0–0.7)
EOSINOPHIL NFR BLD AUTO: 3 %
ERYTHROCYTE [DISTWIDTH] IN BLOOD BY AUTOMATED COUNT: 3.31 M/UL (ref 4.3–5.9)
ERYTHROCYTE [DISTWIDTH] IN BLOOD: 12.6 % (ref 11.5–15.5)
GLUCOSE SERPL-MCNC: 115 MG/DL (ref 74–99)
HCT VFR BLD AUTO: 31.9 % (ref 39–53)
HGB BLD-MCNC: 10.3 GM/DL (ref 13–17.5)
LYMPHOCYTES # SPEC AUTO: 0.5 K/UL (ref 1–4.8)
LYMPHOCYTES NFR SPEC AUTO: 5 %
MAGNESIUM SPEC-SCNC: 2 MG/DL (ref 1.6–2.3)
MCH RBC QN AUTO: 31 PG (ref 25–35)
MCHC RBC AUTO-ENTMCNC: 32.1 G/DL (ref 31–37)
MCV RBC AUTO: 96.4 FL (ref 80–100)
MONOCYTES # BLD AUTO: 0.7 K/UL (ref 0–1)
MONOCYTES NFR BLD AUTO: 7 %
NEUTROPHILS # BLD AUTO: 7.9 K/UL (ref 1.3–7.7)
NEUTROPHILS NFR BLD AUTO: 84 %
PLATELET # BLD AUTO: 253 K/UL (ref 150–450)
POTASSIUM SERPL-SCNC: 4.6 MMOL/L (ref 3.5–5.1)
PROT SERPL-MCNC: 6.2 G/DL (ref 6.3–8.2)
SODIUM SERPL-SCNC: 136 MMOL/L (ref 137–145)
WBC # BLD AUTO: 9.4 K/UL (ref 3.8–10.6)

## 2023-09-14 RX ADMIN — SODIUM CHLORIDE, PRESERVATIVE FREE SCH ML: 5 INJECTION INTRAVENOUS at 09:12

## 2023-09-14 RX ADMIN — HEPARIN SODIUM SCH UNIT: 5000 INJECTION, SOLUTION INTRAVENOUS; SUBCUTANEOUS at 09:11

## 2023-09-14 RX ADMIN — FLUTICASONE PROPIONATE SCH SPRAY: 50 SPRAY, METERED NASAL at 09:14

## 2023-09-14 NOTE — CA
Transthoracic Echo Report 

 Name: Eber Deleon 

 MRN:    [id number] 

 Age:    78     Gender:     M 

 

 :    1945 

 Exam Date:     2023 07:41 

 Exam Location: [facility] 

 Ht (in):     66     Wt (lb):     160 

 Ordering Physician:        Eden Chappell 

 Attending/Referring Phys:         [id number], Misti 

 Technician         Ed Arellano 

 Procedure CPT: 

 Indications:       post TAVR 

 

 Cardiac Hx: 

 Technical Quality:      Technically difficult study 

 Contrast 1:                                Total Dose (mL): 

 Contrast 2:                                Total Dose (mL): 

 

 MEASUREMENTS  (Male / Female) Normal Values 

 2D ECHO 

 LV Diastolic Diameter PLAX        4.2 cm                4.2 - 5.9 / 3.9 - 5.3 cm 

 LV Systolic Diameter PLAX         3.1 cm                 

 IVS Diastolic Thickness           1.1 cm                0.6 - 1.0 / 0.6 - 0.9 cm 

 LVPW Diastolic Thickness          1.2 cm                0.6 - 1.0 / 0.6 - 0.9 cm 

 LV Relative Wall Thickness        0.5                    

 RV Internal Dim ED PLAX           3.0 cm                 

 LVOT Diameter                     2.0 cm                 

 Aortic Root Diameter              2.8 cm                 

 LA Systolic Diameter LX           2.0 cm                3.0 - 4.0 / 2.7 - 3.8 cm 

 LV Diastolic Volume MOD BP        53.6 cm???              67 - 155 / 56 - 104 cm??? 

 LV Systolic Volume MOD BP         16.7 cm???              22 - 58 / 19 - 49 cm??? 

 LV Ejection Fraction MOD BP       68.8 %                >= 55  % 

 LV Diastolic Volume MOD 4C        63.6 cm???               

 LV Systolic Volume MOD 4C         20.7 cm???               

 LV Ejection Fraction MOD 4C       67.5 %                 

 LV Diastolic Length 4C            7.3 cm                 

 LV Systolic Length 4C             6.3 cm                 

 LV Diastolic Volume MOD 2C        41.0 cm???               

 LV Systolic Volume MOD 2C         12.5 cm???               

 LV Ejection Fraction MOD 2C       69.6 %                 

 LV Diastolic Length 2C            6.6 cm                 

 LV Systolic Length 2C             5.7 cm                 

 LA Volume                         53.8 cm???              18 - 58 / 22 - 52 cm??? 

 

 DOPPLER 

 AV Peak Velocity                  205.2 cm/s             

 AV Peak Gradient                  16.8 mmHg              

 AV Mean Velocity                  130.8 cm/s             

 AV Mean Gradient                  8.1 mmHg               

 AV Velocity Time Integral         37.1 cm                

 AI Peak Velocity                  297.3 cm/s             

 AI Peak Gradient                  35.4 mmHg              

 AI Pressure Half Time             591.3 ms               

 LVOT Peak Velocity                145.5 cm/s             

 LVOT Peak Gradient                8.5 mmHg               

 LVOT Velocity Time Integral       30.2 cm                

 LVOT Stroke Volume                93.2 cm???               

 LVOT Stroke Volume Index          51.2 ml/m???             

 AV Area Cont Eq vti               2.5 cm???                

 AV Area Cont Eq pk                2.2 cm???                

 MV Peak Velocity                  158.9 cm/s             

 MV Peak Gradient                  10.1 mmHg              

 MV Mean Velocity                  77.6 cm/s              

 MV Mean Gradient                  2.9 mmHg               

 MV Velocity Time Integral         37.6 cm                

 Mitral E Point Velocity           106.9 cm/s             

 Mitral A Point Velocity           154.9 cm/s             

 Mitral E to A Ratio               0.7                    

 MV Deceleration Time              242.7 ms               

 MV E' Velocity                    5.3 cm/s               

 Mitral E to MV E' Ratio           20.1                   

 TR Peak Velocity                  287.1 cm/s             

 TR Peak Gradient                  33.0 mmHg              

 Right Ventricular Systolic Press  38.0 mmHg              

 PV Peak Velocity                  93.2 cm/s              

 PV Peak Gradient                  3.5 mmHg               

 

 

 FINDINGS 

 Left Ventricle 

 Normal LV size and wall thickness. Left ventricular ejection fraction is  

 estimated at 55-60 %.normal left ventricular wall motion. 

 

 Right Ventricle 

 Normal right ventricular size. RVSP= 38mmHg. 

 

 Right Atrium 

 Normal right atrial size. 

 

 Left Atrium 

 Normal left atrial size. 

 

 Mitral Valve 

 Moderate mitral annular calcification. Mild mitral regurgitation. 

 

 Aortic Valve 

 POST TAVR. Aortic valve not well visualized. Mild prosthetic aortic valve  

 regurgitation. 

 

 Tricuspid Valve 

 Structurally normal tricuspid valve. Mild TR. 

 

 Pulmonic Valve 

 Pulmonic valve not well visualized. Trace PI. 

 

 Pericardium 

 Normal pericardium. 

 

 Aorta 

 Normal size aortic root . 

 

 CONCLUSIONS 

 Technically difficult study.  

 1.  Normal left ventricle size and systolic function 

 2. TAVR with mild aortic regurgitation and a mean gradient of 8 mmHg 

 3.  Mild mitral and tricuspid regurgitation with mild pulmonary hypertension 

 Previewed by:  

 Dr. Mahendra Adams MD 

 (Electronically Signed) 

 Final Date:      2023 12:26

## 2023-09-14 NOTE — P.DS
Providers


Date of admission: 


09/13/23 07:42





Expected date of discharge: 09/14/23


Attending physician: 


Samir Strong DO





Consults: 





                                        





09/13/23 08:00


Consult to Anesthesia Routine 


   Consulting Provider: Anesthesia,Services


   Consult Reason/Comments: Cardiac Surgery Pre-Op





09/13/23 12:46


Consult Physician Routine 


   Consulting Provider: Pamela Hernandez


   Consult Reason/Comments: post TAVR


   Do you want consulting provider notified?: Already Contacted











Primary care physician: 


Brian Iqbal





[facility] Course: 





MEDICAL HISTORY:


1.  Calcified aortic valve with severe symptomatic aortic valve stenosis, NYHA 

II


2.  Hypertension


3.  Hyperlipidemia


4.  PAD, intermittent claudication with previous angioplasty of the right and 

left SFA as well as recent stenting of the left SFA


5.  Diet-controlled diabetes


6.  Chronic kidney disease, stage III


7.  History of esophageal stricture


8.  Previous tobacco dependence





PROCEDURE:


1.  Percutaneous aortic valve implantation using a 29 mm Evolute FX under PETER 

and fluoroscopy guidance


2.  Transesophageal echocardiography performed by anesthesia


3.  Ultrasound-guided access and repair of left femoral artery access site by 

Perclose closure device


4.  Placement of temporary pacemaker wire


5.  Aortic root angiography


6.  Pre-balloon aortic valvuloplasty with a 22 mm True balloon 


7.  Repair of left femoral artery using 6.0 x 120 mm supera-stent, post dilated 

with a 6.0 balloon


8.  IVUS left femoral artery





HISTORY OF PRESENT ILLNESS: This is an 78-year-old gentleman who follows on an 

outpatient basis with Dr. Iqbal  for primary care and Dr. Snyder for 

cardiology.  He has a known history of severe aortic stenosis and has been 

symptomatic with increased exertional dyspnea as well as chest discomfort.  He 

had been referred to structural heart clinic for evaluation for transcatheter 

aortic valve replacement after heart catheterization and transthoracic 

echocardiogram were completed.  Echocardiography demonstrated normal systolic 

function with EF 55%, aortic valve area 0.8 cm with a peak/mean gradient 80/48 

mmHg.  Heart catheterization showed mild nonobstructive coronary artery disease.

 After workup was completed STS risk score was calculated along with incremental

risk and the patient was felt to be low risk for surgical aortic valve 

replacement, however patient preferred to have TAVR approach.  The usual course 

of TAVR was discussed in detail the patient, risks and benefits were reviewed, 

shared decision making between cardiology, surgery, and the patient/family took 

place, and the patient consented to proceed with the procedure.  His TAVR was 

scheduled after peripheral angioplasty was completed by Dr. Snyder.





HOSPITAL COURSE: The patient was brought to the hospital on 9/13/23, was taken 

to the extended stay area, prepared in the usual fashion, and subsequently taken

to the cardiac catheterization laboratory where Dr. Strong and Dr. Hernandez 

completed TAVR procedure under conscious sedation with fluoroscopy and TTE.  The

valve was deployed under rapid ventricular pacing and proceeded without event.  

At the end of the procedure there was no significant gradient, hemodynamics were

felt to be acceptable, and there was mild aortic regurgitation.  Upon completion

of the procedure the patient was transferred to the cardiovascular intensive 

care unit where he was recovered and monitored hemodynamically.  His oxygen was 

titrated down, he was tolerating oral diet, his pain was controlled, follow-up 

TTE demonstrated normal left ventricular systolic function,  mild AI with mean 

gradient 8 mmHg and he was ready to be discharged to home on postoperative day 

#1.  He received written and verbal instruction regarding his medications, 

activity restrictions, signs and symptoms requiring physician notification, and 

follow-up appointments.





Patient Condition at Discharge: Stable





Plan - Discharge Summary


Discharge Rx Participant: No


New Discharge Prescriptions: 


New


   Sennosides-Docusate Sodium [Senokot-S] 2 each PO HS PRN  tab


     PRN Reason: Constipation


   Acetaminophen Tab [Tylenol] 650 mg PO Q4HR PRN  tab


     PRN Reason: Fever And/ Or Mild Pain (1-3)





Continue


   Cyanocobalamin (Vitamin B-12) [Vitamin B-12] 1,000 mcg PO DAILY


   Cholecalciferol [Vitamin D3 (25 Mcg = 1000 Iu)] 25 mcg PO DAILY


   Ubidecarenone [Co Q-10] 100 mg PO DAILY


   Pravastatin Sodium 80 mg PO DAILY


   Clopidogrel [Plavix] 75 mg PO 1200


   lisinopriL [Zestril] 5 mg PO HS


   Vitamin K2 90 mcg PO DAILY


   Multivitamin/Iron/Folic Acid [Centrum Adults Tablet] 1 each PO DAILY


   Fluticasone Nasal Spray [Flonase Nasal Spray] 1 spray EA NOSTRIL BID


   Aspirin 81 mg PO DAILY


Discharge Medication List





Cholecalciferol [Vitamin D3 (25 Mcg = 1000 Iu)] 25 mcg PO DAILY 05/19/22 

[History]


Cyanocobalamin (Vitamin B-12) [Vitamin B-12] 1,000 mcg PO DAILY 05/19/22 [

History]


Ubidecarenone [Co Q-10] 100 mg PO DAILY 05/19/22 [History]


Vitamin K2 90 mcg PO DAILY 05/19/22 [History]


lisinopriL [Zestril] 5 mg PO HS 05/19/22 [History]


Fluticasone Nasal Spray [Flonase Nasal Spray] 1 spray EA NOSTRIL BID 06/09/23 

[History]


Multivitamin/Iron/Folic Acid [Centrum Adults Tablet] 1 each PO DAILY 06/09/23 

[History]


Pravastatin Sodium 80 mg PO DAILY 06/09/23 [History]


Aspirin 81 mg PO DAILY 06/13/23 [History]


Clopidogrel [Plavix] 75 mg PO 1200 08/30/23 [History]


Acetaminophen Tab [Tylenol] 650 mg PO Q4HR PRN  tab 09/14/23 [Rx]


Sennosides-Docusate Sodium [Senokot-S] 2 each PO HS PRN  tab 09/14/23 [Rx]








Follow up Appointment(s)/Referral(s): 


Mohan Snyder MD [STAFF PHYSICIAN] - 09/18/23 10:15 am (Your appointment 9/18 is 

for groin check.  You also have a 30 day post TAVR echo 11/29/23 @ 7:15 am and 

appointment with Dr. Snyder 11/27/23 @ 3:15 pm.  Your 1 year post TAVR echo is 

9/13/24 @ 1 pm, and appointment with Dr. Snyder 9/13/24 @ 2:15 pm)


Brian Iqbal,  [Primary Care Provider] - As Needed


Clinic,Structural Heart [NON-STAFF] - 11/29/23 9:00 am (Your appointment 11/29 

at the valve clinic is for 30 day post TAVR (between your echo and appt w/ Dr. Snyder-you may come at 2:45 instead of 9 am if you wish).  You also have a 1 year 

post TAVR follow up at the valve clinic 9/13/24 @ 12:30 )


Ambulatory/Diagnostic Orders: 


Basic Metabolic Panel [LAB.AMB] Location: None Selected


Complete Blood Count w/diff [LAB.AMB] Location: None Selected


Complete Blood Count w/diff [LAB.AMB] Location: None Selected


Comprehensive Metabolic Panel [LAB.AMB] Location: None Selected


Activity/Diet/Wound Care/Special Instructions: 


DISCHARGE INSTRUCTIONS: 


1.  No driving for 1 week, or until physician gives their ok.


2.  No lifting, pushing, or pulling more than 5-10 pounds for 1 week. 


3.  Hold both groins when you cough or sneeze for the next 2 weeks. Bruising is 

common, but report increased swelling, pain or fever >101F


4.  Shower daily.  No pool, hot tub, or bathtub for 1 week


5.  No powders, lotions, ointments on incisions.


6.  No straining, including for bowel movements.  Use stool softner if necessary


7.  Stairs are not an issue. Go slowly, using handrail and take 1 step at a 

time.  Ambulate several times daily


8.  Continue pain control per as needed orders.


9.  Take only the medications listed on your discharge form


10.  Eat low salt (limited to 2 grams or 2000 milligrams) daily, avoid adding 

salt, avoid canned/processed foods


11.  Take your weight daily in the morning and record, bring with you to your 

follow up appointments


12.  Keep all follow up appointments.  You will need a valve clinic appointment 

at 30 days and 1 year post procedure for follow up


13.  You have been referred to and are expected to begin Cardiac Rehab in 

approximately 4 weeks.


14.  You will need antibiotics prior to any dental work, including cleanings, 

and any surgeries to prevent Endocarditis (bacterial infection in your heart)





For any questions or concerns please call your valve coordinators: Eden or Pb @ 

[phone]








Discharge Disposition: HOME SELF-CARE

## 2023-09-14 NOTE — CA
Transthoracic Echo Report 

 Name: Eber Deleon 

 MRN:    [id number] 

 Age:    78     Gender:     M 

 

 :    1945 

 Exam Date:     2023 10:29 

 Exam Location: [facility] 

 Ht (in):     66     Wt (lb):     180 

 Ordering Physician:        Eden Chappell 

 Attending/Referring Phys:         [id number], Misti 

 Technician         Janina John RDCS 

 Procedure CPT: 

 Indications:       tavr 

 

 Cardiac Hx: 

 Technical Quality:      Technically difficult study 

 Contrast 1:                                Total Dose (mL): 

 Contrast 2:                                Total Dose (mL): 

 

 MEASUREMENTS  (Male / Female) Normal Values 

 

 DOPPLER 

 AV Peak Velocity                  374.9 cm/s             

 AV Peak Gradient                  59.0 mmHg              

 AV Mean Velocity                  270.0 cm/s             

 AV Mean Gradient                  30.0 mmHg              

 AV Velocity Time Integral         96.9 cm                

 LVOT Peak Velocity                87.9 cm/s              

 LVOT Peak Gradient                3.1 mmHg               

 

 

 FINDINGS 

 Left Ventricle 

 Left ventricular ejection fraction is estimated at 55-60 %. 

 

 Right Ventricle 

 

 

 Right Atrium 

 

 

 Left Atrium 

 

 

 Mitral Valve 

 

 

 Aortic Valve 

 Pre TAVR . AOV estimated supine stenosis with a peak velocity of 375 m/s, peak  

 gradient  59 mmHg, mean gradient 30 mmHg. Post TAVR AOV insertion max gradient  

 2.3 mmHg and mean 1.0 mmHg. Mild aortic regurgitation. 

 

 Tricuspid Valve 

 

 

 Pulmonic Valve 

 

 

 Pericardium 

 No pericardial effusion. 

 

 Aorta 

 

 

 CONCLUSIONS 

 1.  Normal left ventricular size and systolic function 

 2.  Moderate aortic stenosis with post TAVR evidence of mild aortic  

 regurgitation and no significant gradient 

 Previewed by:  

 Dr. Mahendra Adams MD 

 (Electronically Signed) 

 Final Date:      2023 07:46

## 2023-09-14 NOTE — XR
EXAMINATION TYPE: XR chest 1V portable

 

DATE OF EXAM: 9/14/2023

 

HISTORY: Post Operative Cardiac Surgery 

 

 

COMPARISON: 9/13/2023

 

TECHNIQUE: Single view of the chest is submitted.

 

FINDINGS:

Demonstrated are scattered senescent parenchymal change.  

 

There is no evidence for focal infiltrate. 

 

The heart is stable. Aortic valvular prosthesis.

 

Hilar and mediastinal structures are within normal limits.  

 

Degenerative changes are seen of the dorsal spine. 

 

 IMPRESSION: 

 

1.  Essentially stable chest without evidence for acute pulmonary disease. Postoperative changes note
d.